# Patient Record
(demographics unavailable — no encounter records)

---

## 2024-10-31 NOTE — ASSESSMENT
[FreeTextEntry1] : #CAD , TGs 106 during hospitalization in 10/2024 - [facility]   - repeat lipid panel in 3mo - cont aspirin, atorvastatin  #Heart Murmur Not documented during hospitalization, unclear if newly present. No evidence of valvular disease on inpatient TTE.  - repeat TTE  #ATach Seen by EP during hospitalization for WCT found to be ATach w/ aberrancy. Intermittently noted on telemetry to be in sinus bradycardia to HR 40s. 30d Zio patch placed at discharge - f/u Zio patch - will hold off on rate control given bradycardia while inpatient - may require AC pending Zio patch results  #HTN - increase amlodipine to 10mg - cont losartan 50mg QDay - discussed continuing to take BP daily at home and document daily pressures - RTC in 2w

## 2024-10-31 NOTE — ASSESSMENT
[FreeTextEntry1] : #CAD , TGs 106 during hospitalization in 10/2024 - [facility]   - repeat lipid panel in 3mo - cont aspirin, atorvastatin  #Heart Murmur Not documented during hospitalization, unclear if newly present. No evidence of valvular disease on inpatient TTE.  - repeat TTE  #ATach Seen by EP during hospitalization for WCT found to be ATach w/ aberrancy. Intermittently noted on telemetry to be in sinus bradycardia to HR 40s. 30d Zio patch placed at discharge - f/u Zio patch - will hold off on rate control given bradycardia while inpatient - may require AC pending Zio patch results  #HTN - increase amlodipine to 10mg - cont losartan 50mg QDay - discussed continuing to take BP daily at home and document daily pressures - RTC in 2w

## 2024-10-31 NOTE — PHYSICAL EXAM
[Well Developed] : well developed [Well Nourished] : well nourished [No Acute Distress] : no acute distress [Normal Conjunctiva] : normal conjunctiva [Normal Venous Pressure] : normal venous pressure [No Carotid Bruit] : no carotid bruit [Normal S1, S2] : normal S1, S2 [No Rub] : no rub [No Gallop] : no gallop [Clear Lung Fields] : clear lung fields [Good Air Entry] : good air entry [No Respiratory Distress] : no respiratory distress  [Soft] : abdomen soft [Non Tender] : non-tender [No Masses/organomegaly] : no masses/organomegaly [Normal Bowel Sounds] : normal bowel sounds [Normal Gait] : normal gait [No Edema] : no edema [No Cyanosis] : no cyanosis [No Clubbing] : no clubbing [No Varicosities] : no varicosities [No Rash] : no rash [No Skin Lesions] : no skin lesions [Moves all extremities] : moves all extremities [No Focal Deficits] : no focal deficits [Normal Speech] : normal speech [Alert and Oriented] : alert and oriented [Normal memory] : normal memory [de-identified] : 3/6 systolic murmur best heard at RUSB

## 2024-10-31 NOTE — PHYSICAL EXAM
[Well Developed] : well developed [Well Nourished] : well nourished [No Acute Distress] : no acute distress [Normal Conjunctiva] : normal conjunctiva [Normal Venous Pressure] : normal venous pressure [No Carotid Bruit] : no carotid bruit [Normal S1, S2] : normal S1, S2 [No Rub] : no rub [No Gallop] : no gallop [Clear Lung Fields] : clear lung fields [Good Air Entry] : good air entry [No Respiratory Distress] : no respiratory distress  [Soft] : abdomen soft [Non Tender] : non-tender [No Masses/organomegaly] : no masses/organomegaly [Normal Bowel Sounds] : normal bowel sounds [Normal Gait] : normal gait [No Edema] : no edema [No Cyanosis] : no cyanosis [No Clubbing] : no clubbing [No Varicosities] : no varicosities [No Rash] : no rash [No Skin Lesions] : no skin lesions [Moves all extremities] : moves all extremities [No Focal Deficits] : no focal deficits [Normal Speech] : normal speech [Alert and Oriented] : alert and oriented [Normal memory] : normal memory [de-identified] : 3/6 systolic murmur best heard at RUSB

## 2024-10-31 NOTE — HISTORY OF PRESENT ILLNESS
[FreeTextEntry1] : 73M PMH RBBB, BPH, HTN, HLD, CAD, ATach, recent hospitalization for cerebellar CVA and COVID infection. During hospitalization was found to have lateral RWMA on TTE and Coronary CTA with mid-LAD with severe stenosis. Catheterization deferred to outpatient setting due to risk for hemorrhagic conversion of stroke with heparin bolus in the acute setting. Course also c/b newly diagnosed ATach w/ aberrancy for which 30 day monitor was placed. Presents for follow-up. Reports feeling well since hospitalization, no further dizziness or weakness. Most mornings is a little dizzy when he stands up from bed, but does not have dizziness with standing at any other times of the day. Denies CP, SOB, is able to walk 3-4 blocks without stopping although reports he has to take stairs slowly. Denies palpitations, orthopnea. Per patient's son they take his BP at home and it is typically 130s/80s.  PMH: BPH, CAD, ATach w/ aberrancy, CVA, HLD, HTN PSH: none FH: no cardiac, stroke, HTN, HLD history  SH: former tobacco use, quit recently after hospitalization. Denies alcohol or illicit drug use Meds:  amLODIPine 5 mg oral tablet: 1 tab(s) orally once a day aspirin 81 mg oral delayed release tablet: 1 tab(s) orally once a day atorvastatin 80 mg oral tablet: 1 tab(s) orally once a day (at bedtime) finasteride 5 mg oral tablet: 1 tab(s) orally once a day Flomax 0.4 mg oral capsule: 1 cap(s) orally once a day (at bedtime) losartan 50 mg oral tablet: 1 tab(s) orally once a day nicotine 21 mg/24 hr transdermal film, extended release: 1 patch transdermal once a day All: NKDA  Coronary CTA 10/2024: Severe luminal narrowing of the mid left anterior descending coronary artery secondary to mixed calcified and noncalcified plaque. Moderate luminal narrowing of the distal left anterior descending. Severe luminal narrowing of a large caliber second obtuse marginal branch secondary to mixed calcified and noncalcified plaque. No large patent foramen ovale or septal defect is visualized.  TTE 10/8/24:  1. Left ventricular systolic function is normal with an ejection fraction visually estimated at 50to 55 %.  2. Entire lateral wall is abnormal.  3. Mildly enlarged right ventricular cavity size and normal right ventricular systolic function.  4. No evidence of a patent foramen ovale by color flow Doppler.  5. If clinical concern for paradoxical embolus remains, consider limited TTE with bubble study.  6. Findings were discussed with Eduarda Esquivel on 10/8/2024 at 1043. No prior echocardiogram is available for comparison.

## 2024-11-14 NOTE — ASSESSMENT
[FreeTextEntry1] : #CAD , TGs 106 during hospitalization in 10/2024. [facility] with multivessel disease, now s/p ISAIAH x2 - repeat lipid panel in 1/2024 - cont aspirin, plavix, atorvastatin  #Heart Murmur Not documented during hospitalization, unclear if newly present. No evidence of valvular disease on inpatient TTE. - repeat TTE  #ATach Seen by EP during hospitalization for WCT found to be ATach w/ aberrancy. Intermittently noted on telemetry to be in sinus bradycardia to HR 40s. 30d Zio patch placed at discharge showing occasional NSVT, 3 events ATach, PAC burden 11%, PVC burden 2%, HR nnamdi 30s typically while asleep, avg HR 50s-60s. Likely due to ischemia, now s/p ISAIAH x2.  - will hold off on rate control given bradycardia and revascularization  #HTN - c/w amlodipine to 10mg, losartan 50 - discussed continuing to take BP daily at home and document daily pressures - RTC in 2mo

## 2024-11-14 NOTE — HISTORY OF PRESENT ILLNESS
[FreeTextEntry1] : 73M PMH RBBB, BPH, HTN, HLD, CAD s/p ISAIAH x2 to mLAD/dRCA, ATach, recent hospitalization for cerebellar CVA and COVID infection. During hospitalization was found to have lateral RWMA on TTE and Coronary CTA with mid-LAD with severe stenosis. Catheterization deferred to outpatient setting due to risk for hemorrhagic conversion of stroke with heparin bolus in the acute setting. Course also c/b newly diagnosed ATach w/ aberrancy for which 30 day monitor was placed. Presents for follow-up. 2 stents placed, reports feeling improvement in [place] when climbing stairs since placeed. Denies CP, SOB, palpitations, orthopnea. Per patient's son they take his BP at home and it is typically 120s-130s/80s.  Coronary CTA 10/2024: Severe luminal narrowing of the mid left anterior descending coronary artery secondary to mixed calcified and noncalcified plaque. Moderate luminal narrowing of the distal left anterior descending. Severe luminal narrowing of a large caliber second obtuse marginal branch secondary to mixed calcified and noncalcified plaque. No large patent foramen ovale or septal defect is visualized.  TTE 10/8/24: 1. Left ventricular systolic function is normal with an ejection fraction visually estimated at 50to 55 %. 2. Entire lateral wall is abnormal. 3. Mildly enlarged right ventricular cavity size and normal right ventricular systolic function. 4. No evidence of a patent foramen ovale by color flow Doppler.  [facility] 11/2024: LM: mild atherosclerosis. LAD 80 % stenosis in the middle third portion of the segment. CX: Distal circumflex: 100 % stenosis. First obtuse marginal:  99% stenosis.  RCA: 90% stenosis in the distal third portion of the segment. S/p ISAIAH to mLAD and staged ISAIAH to dRCA.   Zio Patch 10/2024: 15 runs NSVT w/ longest 12s, 3 runs ATach to . HR lows 30s-50s while asleep, avg HR 50s-60s. PAC burden 11%, PVC burden 2%  PMH: BPH, CAD, ATach w/ aberrancy, CVA, HLD, HTN PSH: none FH: no cardiac, stroke, HTN, HLD history SH: former tobacco use, quit recently after hospitalization. Denies alcohol or illicit drug use Meds: amLODIPine 10 mg oral tablet: 1 tab(s) orally once a day aspirin 81 mg oral delayed release tablet: 1 tab(s) orally once a day Plavix 75mg atorvastatin 80 mg oral tablet: 1 tab(s) orally once a day (at bedtime) finasteride 5 mg oral tablet: 1 tab(s) orally once a day Flomax 0.4 mg oral capsule: 1 cap(s) orally once a day (at bedtime) losartan 50 mg oral tablet: 1 tab(s) orally once a day nicotine 21 mg/24 hr transdermal film, extended release: 1 patch transdermal once a day All: NKDA

## 2024-11-14 NOTE — HISTORY OF PRESENT ILLNESS
[FreeTextEntry1] : 73M PMH RBBB, BPH, HTN, HLD, CAD s/p ISAIAH x2 to mLAD/dRCA, ATach, recent hospitalization for cerebellar CVA and COVID infection. During hospitalization was found to have lateral RWMA on TTE and Coronary CTA with mid-LAD with severe stenosis. Catheterization deferred to outpatient setting due to risk for hemorrhagic conversion of stroke with heparin bolus in the acute setting. Course also c/b newly diagnosed ATach w/ aberrancy for which 30 day monitor was placed. Presents for follow-up. 2 stents placed, reports feeling improvement in [place] when climbing stairs since placeed. Denies CP, SOB, palpitations, orthopnea. Per patient's son they take his BP at home and it is typically 120s-130s/80s.  Coronary CTA 10/2024: Severe luminal narrowing of the mid left anterior descending coronary artery secondary to mixed calcified and noncalcified plaque. Moderate luminal narrowing of the distal left anterior descending. Severe luminal narrowing of a large caliber second obtuse marginal branch secondary to mixed calcified and noncalcified plaque. No large patent foramen ovale or septal defect is visualized.  TTE 10/8/24: 1. Left ventricular systolic function is normal with an ejection fraction visually estimated at 50to 55 %. 2. Entire lateral wall is abnormal. 3. Mildly enlarged right ventricular cavity size and normal right ventricular systolic function. 4. No evidence of a patent foramen ovale by color flow Doppler.  [facility] 11/2024: LM: mild atherosclerosis. LAD 80 % stenosis in the middle third portion of the segment. CX: Distal circumflex: 100 % stenosis. First obtuse marginal:  99% stenosis.  RCA: 90% stenosis in the distal third portion of the segment. S/p ISAIAH to mLAD and staged ISAIAH to dRCA.   Zio Patch 10/2024: 15 runs NSVT w/ longest 12s, 3 runs ATach to . HR lows 30s-50s while asleep, avg HR 50s-60s. PAC burden 11%, PVC burden 2%  PMH: BPH, CAD, ATach w/ aberrancy, CVA, HLD, HTN PSH: none FH: no cardiac, stroke, HTN, HLD history SH: former tobacco use, quit recently after hospitalization. Denies alcohol or illicit drug use Meds: amLODIPine 10 mg oral tablet: 1 tab(s) orally once a day aspirin 81 mg oral delayed release tablet: 1 tab(s) orally once a day Plavix 75mg atorvastatin 80 mg oral tablet: 1 tab(s) orally once a day (at bedtime) finasteride 5 mg oral tablet: 1 tab(s) orally once a day Flomax 0.4 mg oral capsule: 1 cap(s) orally once a day (at bedtime) losartan 50 mg oral tablet: 1 tab(s) orally once a day nicotine 21 mg/24 hr transdermal film, extended release: 1 patch transdermal once a day All: NKDA

## 2024-11-14 NOTE — ASSESSMENT
[FreeTextEntry1] : #CAD , TGs 106 during hospitalization in 10/2024. [facility] with multivessel disease, now s/p ISAIAH x2 - repeat lipid panel in 1/2024 - cont aspirin, plavix, atorvastatin  #Heart Murmur Not documented during hospitalization, unclear if newly present. No evidence of valvular disease on inpatient TTE. - repeat TTE  #ATach Seen by EP during hospitalization for WCT found to be ATach w/ aberrancy. Intermittently noted on telemetry to be in sinus bradycardia to HR 40s. 30d Zio patch placed at discharge showing occasional NSVT, 3 events ATach, PAC burden 11%, PVC burden 2%, HR nnamdi 30s typically while asleep, avg HR 50s-60s. Likely due to ischemia, now s/p ISAIAH x2.  - will hold off on rate control given bradycardia and revascularization  #HTN - c/w amlodipine to 10mg, losartan 50 - discussed continuing to take BP daily at home and document daily pressures - RTC in 2mo

## 2024-11-14 NOTE — HISTORY OF PRESENT ILLNESS
[FreeTextEntry1] : 73M PMH RBBB, BPH, HTN, HLD, CAD s/p ISAIAH x2 to mLAD/dRCA, ATach, recent hospitalization for cerebellar CVA and COVID infection. During hospitalization was found to have lateral RWMA on TTE and Coronary CTA with mid-LAD with severe stenosis. Catheterization deferred to outpatient setting due to risk for hemorrhagic conversion of stroke with heparin bolus in the acute setting. Course also c/b newly diagnosed ATach w/ aberrancy for which 30 day monitor was placed. Presents for follow-up. 2 stents placed, reports feeling improvement in [place] when climbing stairs since placeed. Denies CP, SOB, palpitations, orthopnea. Per patient's son they take his BP at home and it is typically 120s-130s/80s.  Coronary CTA 10/2024: Severe luminal narrowing of the mid left anterior descending coronary artery secondary to mixed calcified and noncalcified plaque. Moderate luminal narrowing of the distal left anterior descending. Severe luminal narrowing of a large caliber second obtuse marginal branch secondary to mixed calcified and noncalcified plaque. No large patent foramen ovale or septal defect is visualized.  TTE 10/8/24: 1. Left ventricular systolic function is normal with an ejection fraction visually estimated at 50to 55 %. 2. Entire lateral wall is abnormal. 3. Mildly enlarged right ventricular cavity size and normal right ventricular systolic function. 4. No evidence of a patent foramen ovale by color flow Doppler.  [facility] 11/2024: LM: mild atherosclerosis. LAD 80 % stenosis in the middle third portion of the segment. CX: Distal circumflex: 100 % stenosis. First obtuse marginal:  99% stenosis.  RCA: 90% stenosis in the distal third portion of the segment. S/p ISAIAH to mLAD and staged ISAIAH to dRCA.   Zio Patch 10/2024: 15 runs NSVT w/ longest 12s, 3 runs ATach to . HR lows 30s-50s while asleep, avg HR 50s-60s. PAC burden 11%, PVC burden 2%  PMH: BPH, CAD, ATach w/ aberrancy, CVA, HLD, HTN PSH: none FH: no cardiac, stroke, HTN, HLD history SH: former tobacco use, quit recently after hospitalization. Denies alcohol or illicit drug use Meds: amLODIPine 10 mg oral tablet: 1 tab(s) orally once a day aspirin 81 mg oral delayed release tablet: 1 tab(s) orally once a day Plavix 75mg atorvastatin 80 mg oral tablet: 1 tab(s) orally once a day (at bedtime) finasteride 5 mg oral tablet: 1 tab(s) orally once a day Flomax 0.4 mg oral capsule: 1 cap(s) orally once a day (at bedtime) losartan 50 mg oral tablet: 1 tab(s) orally once a day nicotine 21 mg/24 hr transdermal film, extended release: 1 patch transdermal once a day All: NKDA

## 2024-11-14 NOTE — ASSESSMENT
[FreeTextEntry1] : #CAD , TGs 106 during hospitalization in 10/2024. [facility] with multivessel disease, now s/p ISAIAH x2 - repeat lipid panel in 1/2024 - cont aspirin, plavix, atorvastatin  #Heart Murmur Not documented during hospitalization, unclear if newly present. No evidence of valvular disease on inpatient TTE. - repeat TTE  #ATach Seen by EP during hospitalization for WCT found to be ATach w/ aberrancy. Intermittently noted on telemetry to be in sinus bradycardia to HR 40s. 30d Zio patch placed at discharge showing occasional NSVT, 3 events ATach, PAC burden 11%, PVC burden 2%, HR nnamdi 30s typically while asleep, avg HR 50s-60s. Likely due to ischemia, now s/p ISAIAH x2.  - will hold off on rate control given bradycardia and revascularization  #HTN - c/w amlodipine to 10mg, losartan 50 - discussed continuing to take BP daily at home and document daily pressures - RTC in 2mo

## 2025-05-27 NOTE — ASSESSMENT
[FreeTextEntry1] :  #CAD  , TGs 106 during hospitalization in 10/2024. [facility] with multivessel disease, now s/p ISAIAH x2. Brief episodes of CP may represent persistent anginal symptoms, will trial low-dose BB.  - repeat lipid panel, if elevated will add additional agent and repeat lipid panel in 3mo  - cont aspirin, plavix, atorvastatin  - start carvedilol 3.125mg PO BID  #ATach  Seen by EP during hospitalization for WCT found to be ATach w/ aberrancy. Intermittently noted on telemetry to be in sinus bradycardia to HR 40s. 30d Zio patch placed at discharge showing occasional NSVT, 3 events ATach, PAC burden 11%, PVC burden 2%, HR nnamdi 30s typically while asleep, avg HR 50s-60s. Likely due to ischemia, now s/p ISAIAH x2. Off of BB now s/p revascularization  - Repeat Zio patch to evaluate for any persistent arrhythmia  --if abnl will consider rate-control agent    #HTN: Persistently elevated in-office, discussed taking BP daily at home and returning next visit with log - c/w amlodipine to 10mg, losartan 50  - start carvedilol as above - RTC in 1mo

## 2025-05-27 NOTE — ASSESSMENT
[FreeTextEntry1] :  #CAD  , TGs 106 during hospitalization in 10/2024. [facility] with multivessel disease, now s/p ISAIAH x2. Brief episodes of CP may represent persistent anginal symptoms, will trial low-dose BB.  - repeat lipid panel, if elevated will add additional agent and repeat lipid panel in 3mo  - cont aspirin, plavix, atorvastatin  - start carvedilol 3.125mg PO BID  #ATach  Seen by EP during hospitalization for WCT found to be ATach w/ aberrancy. Intermittently noted on telemetry to be in sinus bradycardia to HR 40s. 30d Zio patch placed at discharge showing occasional NSVT, 3 events ATach, PAC burden 11%, PVC burden 2%, HR nnamdi 30s typically while asleep, avg HR 50s-60s. Likely due to ischemia, now s/p ISAIAH x2. Off of BB now s/p revascularization  - Repeat Zio patch to evaluate for any persistent arrhythmia  --if abnl will consider rate-control agent    #HTN: Persistently elevated in-office, discussed taking BP daily at home and returning next visit with log - c/w amlodipine to 10mg, losartan 50  - start carvedilol as above - RTC in 1mo

## 2025-05-27 NOTE — HISTORY OF PRESENT ILLNESS
[FreeTextEntry1] : 73M PMH RBBB, BPH, HTN, HLD, CAD s/p ISAIAH x2 to mLAD/dRCA, ATach,  cerebellar CVA. During  CVA hospitalization in 2024 found to have lateral RWMA on TTE and Coronary CTA with mid-LAD with severe stenosis. Catheterization deferred to outpatient setting. Course also c/b newly diagnosed ATach w/ aberrancy for which 30 day monitor was placed. Now s/p LHC w/ 2 stents placed, reports feeling improvement in [place] when climbing stairs since placed. Reports occasional short episodes of left-sided chest pain lasting ~10 minutes, non-exertional, not associated with SOB or palpitations. It occurs sometimes once a week, sometimes every few days. Reports home BP  typically 120s-130s/80s but does not check it frequently.   -Coronary CTA 10/2024: Severe luminal narrowing of the mid left anterior descending coronary artery. Severe luminal narrowing of a large caliber second obtuse marginal branch   -TTE 10/8/24: LVEF normal at 50 to 55%. Entire lateral wall is abnormal. Mildly enlarged RV cavity size and normal systolic function.   -[facility] 11/2024: LM: mild atherosclerosis. LAD 80 % stenosis in the middle third portion of the segment. CX: Distal circumflex: 100 % stenosis. First obtuse marginal: 99% stenosis. RCA: 90% stenosis in the distal third portion of the segment. S/p ISAIAH to mLAD and staged ISAIAH to dRCA.  -Zio Patch 10/2024: 15 runs NSVT w/ longest 12s, 3 runs ATach to . HR lows 30s-50s while asleep, avg HR 50s-60s. PAC burden 11%, PVC burden 2%  -TTE 5/2025: LV cavity normal in size, LV wall thickness normal. LVEF mildly decreased at 55% +RWMA- basal and mid inferolateral wall, mid anterolateral segment, apical lateral segment, and basal inferior segment. Grade I LV diastolic dysfunction with normal LV filling pressure. Mildly enlarged RV cavity size with normal wall thickness and normal right ventricular systolic function.    PMH: BPH, CAD s/p ISAIAH x2, ATach w/ aberrancy, CVA, HLD, HTN  PSH: none  FH: no cardiac, stroke, HTN, HLD history  SH: former tobacco use, quit recently after hospitalization. Denies alcohol or illicit drug use  Meds:  All: NKDA

## 2025-05-27 NOTE — HISTORY OF PRESENT ILLNESS
[FreeTextEntry1] : 73M PMH RBBB, BPH, HTN, HLD, CAD s/p ISAIAH x2 to mLAD/dRCA, ATach,  cerebellar CVA. During  CVA hospitalization in 2024 found to have lateral RWMA on TTE and Coronary CTA with mid-LAD with severe stenosis. Catheterization deferred to outpatient setting. Course also c/b newly diagnosed ATach w/ aberrancy for which 30 day monitor was placed. Now s/p LHC w/ 2 stents placed, reports feeling improvement in [place] when climbing stairs since placed. Reports occasional short episodes of left-sided chest pain lasting ~10 minutes, non-exertional, not associated with SOB or palpitations. It occurs sometimes once a week, sometimes every few days. Reports home BP  typically 120s-130s/80s but does not check it frequently.   -Coronary CTA 10/2024: Severe luminal narrowing of the mid left anterior descending coronary artery. Severe luminal narrowing of a large caliber second obtuse marginal branch   -TTE 10/8/24: LVEF normal at 50 to 55%. Entire lateral wall is abnormal. Mildly enlarged RV cavity size and normal systolic function.   -[facility] 11/2024: LM: mild atherosclerosis. LAD 80 % stenosis in the middle third portion of the segment. CX: Distal circumflex: 100 % stenosis. First obtuse marginal: 99% stenosis. RCA: 90% stenosis in the distal third portion of the segment. S/p ISAIAH to mLAD and staged ISAIAH to dRCA.  -Zio Patch 10/2024: 15 runs NSVT w/ longest 12s, 3 runs ATach to . HR lows 30s-50s while asleep, avg HR 50s-60s. PAC burden 11%, PVC burden 2%  -TTE 5/2025: LV cavity normal in size, LV wall thickness normal. LVEF mildly decreased at 55% +RWMA- basal and mid inferolateral wall, mid anterolateral segment, apical lateral segment, and basal inferior segment. Grade I LV diastolic dysfunction with normal LV filling pressure. Mildly enlarged RV cavity size with normal wall thickness and normal right ventricular systolic function.    PMH: BPH, CAD s/p ISAIAH x2, ATach w/ aberrancy, CVA, HLD, HTN  PSH: none  FH: no cardiac, stroke, HTN, HLD history  SH: former tobacco use, quit recently after hospitalization. Denies alcohol or illicit drug use  Meds:  All: NKDA

## 2025-05-27 NOTE — ASSESSMENT
[FreeTextEntry1] :  #CAD  , TGs 106 during hospitalization in 10/2024. [facility] with multivessel disease, now s/p ISAIAH x2. Brief episodes of CP may represent persistent anginal symptoms, will trial low-dose BB.  - repeat lipid panel, if elevated will add additional agent and repeat lipid panel in 3mo  - cont aspirin, plavix, atorvastatin  - start carvedilol 3.125mg PO BID  #ATach  Seen by EP during hospitalization for WCT found to be ATach w/ aberrancy. Intermittently noted on telemetry to be in sinus bradycardia to HR 40s. 30d Zio patch placed at discharge showing occasional NSVT, 3 events ATach, PAC burden 11%, PVC burden 2%, HR nnamdi 30s typically while asleep, avg HR 50s-60s. Likely due to ischemia, now s/p ISAIAH x2. Off of BB now s/p revascularization  - Repeat Zio patch to evaluate for any persistent arrhythmia  --if abnl will consider rate-control agent    #HTN: Persistently elevated in-office, discussed taking BP daily at home and returning next visit with log - c/w amlodipine to 10mg, losartan 50  - start carvedilol as above - RTC in 1mo

## 2025-05-27 NOTE — HISTORY OF PRESENT ILLNESS
[FreeTextEntry1] : 73M PMH RBBB, BPH, HTN, HLD, CAD s/p ISAIAH x2 to mLAD/dRCA, ATach,  cerebellar CVA. During  CVA hospitalization in 2024 found to have lateral RWMA on TTE and Coronary CTA with mid-LAD with severe stenosis. Catheterization deferred to outpatient setting. Course also c/b newly diagnosed ATach w/ aberrancy for which 30 day monitor was placed. Now s/p LHC w/ 2 stents placed, reports feeling improvement in [place]ARK when climbing stairs since placed. Reports occasional short episodes of left-sided chest pain lasting ~10 minutes, non-exertional, not associated with SOB or palpitations. It occurs sometimes once a week, sometimes every few days. Reports home BP  typically 120s-130s/80s but does not check it frequently.   -Coronary CTA 10/2024: Severe luminal narrowing of the mid left anterior descending coronary artery. Severe luminal narrowing of a large caliber second obtuse marginal branch   -TTE 10/8/24: LVEF normal at 50 to 55%. Entire lateral wall is abnormal. Mildly enlarged RV cavity size and normal systolic function.   -[facility] 11/2024: LM: mild atherosclerosis. LAD 80 % stenosis in the middle third portion of the segment. CX: Distal circumflex: 100 % stenosis. First obtuse marginal: 99% stenosis. RCA: 90% stenosis in the distal third portion of the segment. S/p ISAIAH to mLAD and staged ISAIAH to dRCA.  -Zio Patch 10/2024: 15 runs NSVT w/ longest 12s, 3 runs ATach to . HR lows 30s-50s while asleep, avg HR 50s-60s. PAC burden 11%, PVC burden 2%  -TTE 5/2025: LV cavity normal in size, LV wall thickness normal. LVEF mildly decreased at 55% +RWMA- basal and mid inferolateral wall, mid anterolateral segment, apical lateral segment, and basal inferior segment. Grade I LV diastolic dysfunction with normal LV filling pressure. Mildly enlarged RV cavity size with normal wall thickness and normal right ventricular systolic function.    PMH: BPH, CAD s/p ISAIAH x2, ATach w/ aberrancy, CVA, HLD, HTN  PSH: none  FH: no cardiac, stroke, HTN, HLD history  SH: former tobacco use, quit recently after hospitalization. Denies alcohol or illicit drug use  Meds:  All: NKDA

## 2025-05-27 NOTE — END OF VISIT
[] : Fellow [FreeTextEntry3] : Continue DAPT and statins Starting low dose Coreg as antianginal and BP control. Continue with risk factors modification including heart healthy diet and regular exercise.

## 2025-07-03 NOTE — HISTORY OF PRESENT ILLNESS
[FreeTextEntry1] : Language: Wolof    73M PMH RBBB, BPH, HTN, HLD, CAD s/p ISAIAH x2 to mLAD/dRCA, ATach, cerebellar CVA. During CVA hospitalization in 2024 found to have lateral RWMA on TTE and Coronary CTA with mid-LAD with severe stenosis. Catheterization deferred to outpatient setting. Course also c/b newly diagnosed ATach w/ aberrancy for which 30 day monitor was placed. Now s/p LHC w/ 2 stents placed, reports feeling improvement in CLARK when climbing stairs since placed. Reports chest discomfort has resolved. Reports one possible episode of palpitations, not causing any issues and would prefer to avoid further medications given his symptoms are controlled.   Has taken daily BPs in the range of 120s/70s.   -Coronary CTA 10/2024: Severe luminal narrowing of the mid left anterior descending coronary artery. Severe luminal narrowing of a large caliber second obtuse marginal branch  -TTE 10/8/24: LVEF normal at 50 to 55%. Entire lateral wall is abnormal. Mildly enlarged RV cavity size and normal systolic function.  -[facility] 11/2024: LM: mild atherosclerosis. LAD 80 % stenosis in the middle third portion of the segment. CX: Distal circumflex: 100 % stenosis. First obtuse marginal: 99% stenosis. RCA: 90% stenosis in the distal third portion of the segment. S/p ISAIAH to mLAD and staged ISAIAH to dRCA.  -Zio Patch 10/2024: 15 runs NSVT w/ longest 12s, 3 runs ATach to . HR lows 30s-50s while asleep, avg HR 50s-60s. PAC burden 11%, PVC burden 2%  -TTE 5/2025: LV cavity normal in size, LV wall thickness normal. LVEF mildly decreased at 55% +RWMA- basal and mid inferolateral wall, mid anterolateral segment, apical lateral segment, and basal inferior segment. Grade I LV diastolic dysfunction with normal LV filling pressure. Mildly enlarged RV cavity size with normal wall thickness and normal right ventricular systolic function.  -Zio Patch 5/2025: min HR 34 @ 3am, max  iso 4b SVT. Avg HR 57. Predominantly sinus rhythm. 3 episodes NSVT, fastes  and longest 7b. 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 Fastest SVT 188bpm lasting 4b, longest 20.1s. No sustained arrhythmias. 1 triggered event during sinus tachycardia with APCs.  -Lipids 6/2025: LDL 77, HDL 59, TGs 48, Chol 147   PMH: BPH, CAD s/p ISAIAH x2, ATach w/ aberrancy, CVA, HLD, HTN  PSH: none  FH: no cardiac, stroke, HTN, HLD history  SH: former tobacco use, quit recently after hospitalization. Denies alcohol or illicit drug use  Meds: as listed All: NKDA

## 2025-07-03 NOTE — PHYSICAL EXAM
[Well Developed] : well developed [Well Nourished] : well nourished [No Acute Distress] : no acute distress [Normal Conjunctiva] : normal conjunctiva [Normal Venous Pressure] : normal venous pressure [No Carotid Bruit] : no carotid bruit [Normal S1, S2] : normal S1, S2 [No Rub] : no rub [No Gallop] : no gallop [Clear Lung Fields] : clear lung fields [Good Air Entry] : good air entry [No Respiratory Distress] : no respiratory distress  [Soft] : abdomen soft [Non Tender] : non-tender [No Masses/organomegaly] : no masses/organomegaly [Normal Bowel Sounds] : normal bowel sounds [Normal Gait] : normal gait [No Edema] : no edema [No Cyanosis] : no cyanosis [No Clubbing] : no clubbing [No Varicosities] : no varicosities [No Rash] : no rash [No Skin Lesions] : no skin lesions [Moves all extremities] : moves all extremities [No Focal Deficits] : no focal deficits [Normal Speech] : normal speech [Alert and Oriented] : alert and oriented [Normal memory] : normal memory [de-identified] : 3/6 systolic murmur best heard at RUSB

## 2025-07-03 NOTE — PHYSICAL EXAM
[Well Developed] : well developed [Well Nourished] : well nourished [No Acute Distress] : no acute distress [Normal Conjunctiva] : normal conjunctiva [Normal Venous Pressure] : normal venous pressure [No Carotid Bruit] : no carotid bruit [Normal S1, S2] : normal S1, S2 [No Rub] : no rub [No Gallop] : no gallop [Clear Lung Fields] : clear lung fields [Good Air Entry] : good air entry [No Respiratory Distress] : no respiratory distress  [Soft] : abdomen soft [Non Tender] : non-tender [No Masses/organomegaly] : no masses/organomegaly [Normal Bowel Sounds] : normal bowel sounds [Normal Gait] : normal gait [No Edema] : no edema [No Cyanosis] : no cyanosis [No Clubbing] : no clubbing [No Varicosities] : no varicosities [No Rash] : no rash [No Skin Lesions] : no skin lesions [Moves all extremities] : moves all extremities [No Focal Deficits] : no focal deficits [Normal Speech] : normal speech [Alert and Oriented] : alert and oriented [Normal memory] : normal memory [de-identified] : 3/6 systolic murmur best heard at RUSB

## 2025-07-03 NOTE — HISTORY OF PRESENT ILLNESS
[FreeTextEntry1] : Language: Indonesian    73M PMH RBBB, BPH, HTN, HLD, CAD s/p ISAIAH x2 to mLAD/dRCA, ATach, cerebellar CVA. During CVA hospitalization in 2024 found to have lateral RWMA on TTE and Coronary CTA with mid-LAD with severe stenosis. Catheterization deferred to outpatient setting. Course also c/b newly diagnosed ATach w/ aberrancy for which 30 day monitor was placed. Now s/p LHC w/ 2 stents placed, reports feeling improvement in [place] when climbing stairs since placed. Reports chest discomfort has resolved. Reports one possible episode of palpitations, not causing any issues and would prefer to avoid further medications given his symptoms are controlled.   Has taken daily BPs in the range of 120s/70s.   -Coronary CTA 10/2024: Severe luminal narrowing of the mid left anterior descending coronary artery. Severe luminal narrowing of a large caliber second obtuse marginal branch  -TTE 10/8/24: LVEF normal at 50 to 55%. Entire lateral wall is abnormal. Mildly enlarged RV cavity size and normal systolic function.  -[facility] 11/2024: LM: mild atherosclerosis. LAD 80 % stenosis in the middle third portion of the segment. CX: Distal circumflex: 100 % stenosis. First obtuse marginal: 99% stenosis. RCA: 90% stenosis in the distal third portion of the segment. S/p ISAIAH to mLAD and staged ISAIAH to dRCA.  -Zio Patch 10/2024: 15 runs NSVT w/ longest 12s, 3 runs ATach to . HR lows 30s-50s while asleep, avg HR 50s-60s. PAC burden 11%, PVC burden 2%  -TTE 5/2025: LV cavity normal in size, LV wall thickness normal. LVEF mildly decreased at 55% +RWMA- basal and mid inferolateral wall, mid anterolateral segment, apical lateral segment, and basal inferior segment. Grade I LV diastolic dysfunction with normal LV filling pressure. Mildly enlarged RV cavity size with normal wall thickness and normal right ventricular systolic function.  -Zio Patch 5/2025: min HR 34 @ 3am, max  iso 4b SVT. Avg HR 57. Predominantly sinus rhythm. 3 episodes NSVT, fastes  and longest 7b. 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 Fastest SVT 188bpm lasting 4b, longest 20.1s. No sustained arrhythmias. 1 triggered event during sinus tachycardia with APCs.  -Lipids 6/2025: LDL 77, HDL 59, TGs 48, Chol 147   PMH: BPH, CAD s/p ISAIAH x2, ATach w/ aberrancy, CVA, HLD, HTN  PSH: none  FH: no cardiac, stroke, HTN, HLD history  SH: former tobacco use, quit recently after hospitalization. Denies alcohol or illicit drug use  Meds: as listed All: NKDA

## 2025-07-03 NOTE — PHYSICAL EXAM
[Well Developed] : well developed [Well Nourished] : well nourished [No Acute Distress] : no acute distress [Normal Conjunctiva] : normal conjunctiva [Normal Venous Pressure] : normal venous pressure [No Carotid Bruit] : no carotid bruit [Normal S1, S2] : normal S1, S2 [No Rub] : no rub [No Gallop] : no gallop [Clear Lung Fields] : clear lung fields [Good Air Entry] : good air entry [No Respiratory Distress] : no respiratory distress  [Soft] : abdomen soft [Non Tender] : non-tender [No Masses/organomegaly] : no masses/organomegaly [Normal Bowel Sounds] : normal bowel sounds [Normal Gait] : normal gait [No Edema] : no edema [No Cyanosis] : no cyanosis [No Clubbing] : no clubbing [No Varicosities] : no varicosities [No Rash] : no rash [No Skin Lesions] : no skin lesions [Moves all extremities] : moves all extremities [No Focal Deficits] : no focal deficits [Normal Speech] : normal speech [Alert and Oriented] : alert and oriented [Normal memory] : normal memory [de-identified] : 3/6 systolic murmur best heard at RUSB

## 2025-07-03 NOTE — ASSESSMENT
[FreeTextEntry1] : #CAD  , TGs 106 during hospitalization in 10/2024. [facility] with multivessel disease, now s/p ISAIAH x2. Brief episodes of CP may represent persistent anginal symptoms, have improved since starting trial low-dose BB.  - LDL 77, will add ezetimibe 10mg PO QDay    --repeat lipid panel in 3mo - cont aspirin, plavix, atorvastatin  - cont carvedilol 3.125mg PO BID, will not raise dose given baseline HR 57    #ATach  #NSVT  #SVT  During hospitalization noted to have WCT found to be ATach w/ aberrancy. Intermittently noted on telemetry to be in sinus bradycardia to HR 40s. 30d Zio patch placed at discharge showing occasional NSVT, 3 events ATach, PAC burden 11%, PVC burden 2%, HR nnamdi 30s typically while asleep, avg HR 50s-60s. Repeat 14d Zio patch with 59 SVT episodes, longest lasting 20s, 3 events NSVT. Likely due to chronic coronary disease.   -c/w carvedilol as above, will not raise dose given baseine HR 57  - EP referral to discuss possible ablation  #HTN: Persistently elevated in-office, however daily BP checks at home all in range of 120s/70s - c/w amlodipine 10mg, losartan 50, carvedilol 3.125

## 2025-07-03 NOTE — HISTORY OF PRESENT ILLNESS
[FreeTextEntry1] : Language: Syriac    73M PMH RBBB, BPH, HTN, HLD, CAD s/p ISAIAH x2 to mLAD/dRCA, ATach, cerebellar CVA. During CVA hospitalization in 2024 found to have lateral RWMA on TTE and Coronary CTA with mid-LAD with severe stenosis. Catheterization deferred to outpatient setting. Course also c/b newly diagnosed ATach w/ aberrancy for which 30 day monitor was placed. Now s/p LHC w/ 2 stents placed, reports feeling improvement in [place] when climbing stairs since placed. Reports chest discomfort has resolved. Reports one possible episode of palpitations, not causing any issues and would prefer to avoid further medications given his symptoms are controlled.   Has taken daily BPs in the range of 120s/70s.   -Coronary CTA 10/2024: Severe luminal narrowing of the mid left anterior descending coronary artery. Severe luminal narrowing of a large caliber second obtuse marginal branch  -TTE 10/8/24: LVEF normal at 50 to 55%. Entire lateral wall is abnormal. Mildly enlarged RV cavity size and normal systolic function.  -[facility] 11/2024: LM: mild atherosclerosis. LAD 80 % stenosis in the middle third portion of the segment. CX: Distal circumflex: 100 % stenosis. First obtuse marginal: 99% stenosis. RCA: 90% stenosis in the distal third portion of the segment. S/p ISAIAH to mLAD and staged ISAIAH to dRCA.  -Zio Patch 10/2024: 15 runs NSVT w/ longest 12s, 3 runs ATach to . HR lows 30s-50s while asleep, avg HR 50s-60s. PAC burden 11%, PVC burden 2%  -TTE 5/2025: LV cavity normal in size, LV wall thickness normal. LVEF mildly decreased at 55% +RWMA- basal and mid inferolateral wall, mid anterolateral segment, apical lateral segment, and basal inferior segment. Grade I LV diastolic dysfunction with normal LV filling pressure. Mildly enlarged RV cavity size with normal wall thickness and normal right ventricular systolic function.  -Zio Patch 5/2025: min HR 34 @ 3am, max  iso 4b SVT. Avg HR 57. Predominantly sinus rhythm. 3 episodes NSVT, fastes  and longest 7b. 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 Fastest SVT 188bpm lasting 4b, longest 20.1s. No sustained arrhythmias. 1 triggered event during sinus tachycardia with APCs.  -Lipids 6/2025: LDL 77, HDL 59, TGs 48, Chol 147   PMH: BPH, CAD s/p ISAIAH x2, ATach w/ aberrancy, CVA, HLD, HTN  PSH: none  FH: no cardiac, stroke, HTN, HLD history  SH: former tobacco use, quit recently after hospitalization. Denies alcohol or illicit drug use  Meds: as listed All: NKDA

## 2025-07-03 NOTE — ASSESSMENT
[FreeTextEntry1] : #CAD  , TGs 106 during hospitalization in 10/2024. [facility] with multivessel disease, now s/p ISAIAH x2. Brief episodes of CP may represent persistent anginal symptoms, have improved since starting trial low-dose BB.  - LDL 77, will add ezetimibe 10mg PO QDay    --repeat lipid panel in 3mo - cont aspirin, plavix, atorvastatin  - cont carvedilol 3.125mg PO BID, will not raise dose given baseline HR 57    #ATach  #NSVT  #SVT  During hospitalization noted to have WCT found to be ATach w/ aberrancy. Intermittently noted on telemetry to be in sinus bradycardia to HR 40s. 30d Zio patch placed at discharge showing occasional NSVT, 3 events ATach, PAC burden 11%, PVC burden 2%, HR nnamdi 30s typically while asleep, avg HR 50s-60s. Repeat 14d Zio patch with 59 SVT episodes, longest lasting 20s, 3 events NSVT. Likely due to chronic coronary disease.   -c/w carvedilol as above, will not raise dose given baseine HR 57  - EP referral to discuss possible ablation  #HTN: Persistently elevated in-office, however daily BP checks at home all in range of 120s/70s - c/w amlodipine 10mg, losartan 50, carvedilol 3.125

## 2025-07-03 NOTE — ASSESSMENT
[FreeTextEntry1] : #CAD  , TGs 106 during hospitalization in 10/2024. [facility] with multivessel disease, now s/p ISAIAH x2. Brief episodes of CP may represent persistent anginal symptoms, have improved since starting trial low-dose BB.  - LDL 77, will add ezetimibe 10mg PO QDay    --repeat lipid panel in 3mo - cont aspirin, plavix, atorvastatin  - cont carvedilol 3.125mg PO BID, will not raise dose given baseline HR 57    #ATach  #NSVT  #SVT  During hospitalization noted to have WCT found to be ATach w/ aberrancy. Intermittently noted on telemetry to be in sinus bradycardia to HR 40s. 30d Zio patch placed at discharge showing occasional NSVT, 3 events ATach, PAC burden 11%, PVC burden 2%, HR nnamdi 30s typically while asleep, avg HR 50s-60s. Repeat 14d Zio patch with 59 SVT episodes, longest lasting 20s, 3 events NSVT. Likely due to chronic coronary disease.   -c/w carvedilol as above, will not raise dose given baseine HR 57  - EP referral to discuss possible ablation  #HTN: Persistently elevated in-office, however daily BP checks at home all in range of 120s/70s - c/w amlodipine 10mg, losartan 50, carvedilol 3.125